# Patient Record
Sex: MALE | Race: WHITE | URBAN - METROPOLITAN AREA
[De-identification: names, ages, dates, MRNs, and addresses within clinical notes are randomized per-mention and may not be internally consistent; named-entity substitution may affect disease eponyms.]

---

## 2020-06-18 ENCOUNTER — FOLLOW UP ESTABLISHED (OUTPATIENT)
Dept: URBAN - METROPOLITAN AREA CLINIC 24 | Facility: CLINIC | Age: 70
End: 2020-06-18
Payer: MEDICARE

## 2020-06-18 DIAGNOSIS — C44.121: Primary | ICD-10-CM

## 2020-06-18 PROCEDURE — 99203 OFFICE O/P NEW LOW 30 MIN: CPT | Performed by: OPHTHALMOLOGY

## 2020-06-18 PROCEDURE — 92285 EXTERNAL OCULAR PHOTOGRAPHY: CPT | Performed by: OPHTHALMOLOGY

## 2021-05-27 ENCOUNTER — OFFICE VISIT (OUTPATIENT)
Dept: URBAN - METROPOLITAN AREA CLINIC 24 | Facility: CLINIC | Age: 71
End: 2021-05-27
Payer: MEDICARE

## 2021-05-27 DIAGNOSIS — H02.112 CICATRICIAL ECTROPION OF RIGHT LOWER EYELID: Primary | ICD-10-CM

## 2021-05-27 DIAGNOSIS — H04.561 STENOSIS OF RIGHT LACRIMAL PUNCTUM: ICD-10-CM

## 2021-05-27 DIAGNOSIS — H04.521 EVERSION OF RIGHT LACRIMAL PUNCTUM: ICD-10-CM

## 2021-05-27 PROCEDURE — 99214 OFFICE O/P EST MOD 30 MIN: CPT | Performed by: OPHTHALMOLOGY

## 2021-05-27 RX ORDER — NEOMYCIN SULFATE, POLYMYXIN B SULFATE AND DEXAMETHASONE 3.5; 10000; 1 MG/G; [USP'U]/G; MG/G
OINTMENT OPHTHALMIC
Qty: 1 | Refills: 1 | Status: ACTIVE
Start: 2021-05-27

## 2021-05-27 NOTE — IMPRESSION/PLAN
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 Plan: The patient demonstrates tarsal ectropion along with midface descent and negative malar vector. This pathology is causing exposure keratoconjunctivitis, FB sensation, tearing, and ocular irritation. To resolve this, I recommended lateral canthal resuspension, conjunctivoplasty with rearrange, medial canthoplasty, ATR<10cm eyelid to achieve myocutaneous elevation of the midface orbicularis/SOOF to recruit anterior lamella, support the lower eyelid position, and prevent hammocking of the lid beneath the globe given negative malar vector, and ectropion repair w/ suture to replace the tarsus in physiologic position, and protect the globe. Risks, benefits, and alternatives (including no surgery) discussed with patient.

## 2021-05-27 NOTE — IMPRESSION/PLAN
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 Plan: Given the degree of punctal stenosis noted on examination, I recommended a punctoplasty to enlarge the proximal portion of the tear drain system, and thereby facilitate improved tear outflow. Risks, benefits, and alternatives (including no surgery) discussed with patient.

## 2021-05-27 NOTE — IMPRESSION/PLAN
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  Plan: s/p carcinoma resection, radiation, reconstruction; paralytic/cicatricial ectropion with globe exposure, corneal damage, and risk of ulcer/perf; discussed addressing this w/ LTS, medial canthoplasty, ATR<10sq cm, conj w/ rearr, ectropion repair suture, tarsorrhaphy tarsal transfer

## 2022-01-21 ENCOUNTER — OFFICE VISIT (OUTPATIENT)
Dept: URBAN - METROPOLITAN AREA CLINIC 16 | Facility: CLINIC | Age: 72
End: 2022-01-21
Payer: MEDICARE

## 2022-01-21 DIAGNOSIS — H25.813 COMBINED FORMS OF AGE-RELATED CATARACT, BILATERAL: Primary | ICD-10-CM

## 2022-01-21 PROCEDURE — 99204 OFFICE O/P NEW MOD 45 MIN: CPT | Performed by: OPHTHALMOLOGY

## 2022-01-21 ASSESSMENT — VISUAL ACUITY
OS: 20/30
OD: 20/40

## 2022-01-21 ASSESSMENT — KERATOMETRY: OS: 44.13

## 2022-01-21 ASSESSMENT — INTRAOCULAR PRESSURE
OS: 16
OD: 16

## 2022-01-21 NOTE — IMPRESSION/PLAN
Impression: Combined forms of age-related cataract, bilateral: H25.813. .  Visually significant, quality of life issue, could improve with surgery. Plan: Cataracts account for the patient's complaints. Discussed all risks, benefits, alternatives, procedures and recovery. Patient understands changing glasses will not improve vision. Patient desires to have surgery, recommend phacoemulsification with intraocular lens implant OD.  lvl 2 - pt considering Vivity IOL - AIM plano. Re-evaluate OS for cataract surgery after OD is done. Not OK for Dexcyu.  Pt. may need further lid surgery, rec cat sx first.

## 2022-03-08 ENCOUNTER — OFFICE VISIT (OUTPATIENT)
Dept: URBAN - METROPOLITAN AREA CLINIC 26 | Facility: CLINIC | Age: 72
End: 2022-03-08
Payer: MEDICARE

## 2022-03-08 DIAGNOSIS — H02.532 EYELID RETRACTION RIGHT LOWER EYELID: ICD-10-CM

## 2022-03-08 DIAGNOSIS — H04.123 DRY EYE SYNDROME OF BILATERAL LACRIMAL GLANDS: Primary | ICD-10-CM

## 2022-03-08 PROCEDURE — 92285 EXTERNAL OCULAR PHOTOGRAPHY: CPT | Performed by: OPHTHALMOLOGY

## 2022-03-08 PROCEDURE — 99214 OFFICE O/P EST MOD 30 MIN: CPT | Performed by: OPHTHALMOLOGY

## 2022-03-08 NOTE — IMPRESSION/PLAN
Impression: Dry eye syndrome of bilateral lacrimal glands: H04.123. Plan: Start PFAT'S q2-3h OU and Nighttime nieves QHS OU. Handout given to patient today.

## 2022-03-08 NOTE — IMPRESSION/PLAN
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 Plan: Right lower eyelid reconstruction following multiple rounds of skin cancer excision and reconstruction. Patient concerned by cyst in lateral aspect of right upper eyelid. On exam, patient has moderate floppy eyelid syndrome OU, worse on right side than left. No cyst visible on exam, rather area of elevation that patient is noting is lateral aspect of everted tarsus (also small portion of lacrimal gland). Patient has significant PEK/CLYDE OD, and RUL overlaps RLL on closure. Also with decreased blink OD. Patient is no longer using artificial tears. Recommend PFAT's every 2 hours during the day and tear gel nieves qhs. Explained that surgery cannot address decreased blink reflex. Explained that cyst is actually the anatomy listed above, and thus I do not recommend excision, as it would be non-additive or possibly worsen dry eye. Encouraged patient to RTC with Dr. Lien Willson. I am happy to see . Cory Norman back in 2 months if he would like.